# Patient Record
Sex: FEMALE | Race: AMERICAN INDIAN OR ALASKA NATIVE
[De-identification: names, ages, dates, MRNs, and addresses within clinical notes are randomized per-mention and may not be internally consistent; named-entity substitution may affect disease eponyms.]

---

## 2017-09-28 ENCOUNTER — HOSPITAL ENCOUNTER (INPATIENT)
Dept: HOSPITAL 31 - C.ER | Age: 32
LOS: 14 days | Discharge: HOME | DRG: 430 | End: 2017-10-12
Attending: PSYCHIATRY & NEUROLOGY | Admitting: PSYCHIATRY & NEUROLOGY
Payer: COMMERCIAL

## 2017-09-28 VITALS — BODY MASS INDEX: 33.2 KG/M2

## 2017-09-28 DIAGNOSIS — I10: ICD-10-CM

## 2017-09-28 DIAGNOSIS — F17.210: ICD-10-CM

## 2017-09-28 DIAGNOSIS — J45.909: ICD-10-CM

## 2017-09-28 DIAGNOSIS — F25.1: Primary | ICD-10-CM

## 2017-09-28 LAB
ALBUMIN/GLOB SERPL: 1.3 {RATIO} (ref 1–2.1)
ALP SERPL-CCNC: 55 U/L (ref 38–126)
ALT SERPL-CCNC: 45 U/L (ref 9–52)
AST SERPL-CCNC: 32 U/L (ref 14–36)
BASOPHILS # BLD AUTO: 0.1 K/UL (ref 0–0.2)
BASOPHILS NFR BLD: 0.6 % (ref 0–2)
BILIRUB SERPL-MCNC: 0.4 MG/DL (ref 0.2–1.3)
BILIRUB UR-MCNC: NEGATIVE MG/DL
BUN SERPL-MCNC: 10 MG/DL (ref 7–17)
CALCIUM SERPL-MCNC: 9 MG/DL (ref 8.6–10.4)
CHLORIDE SERPL-SCNC: 102 MMOL/L (ref 98–107)
CO2 SERPL-SCNC: 22 MMOL/L (ref 22–30)
EOSINOPHIL # BLD AUTO: 0.1 K/UL (ref 0–0.7)
EOSINOPHIL NFR BLD: 1.2 % (ref 0–4)
ERYTHROCYTE [DISTWIDTH] IN BLOOD BY AUTOMATED COUNT: 19.5 % (ref 11.5–14.5)
ETHANOL SERPL-MCNC: < 10 MG/DL (ref 0–10)
GLOBULIN SER-MCNC: 2.9 GM/DL (ref 2.2–3.9)
GLUCOSE SERPL-MCNC: 86 MG/DL (ref 65–105)
GLUCOSE UR STRIP-MCNC: NORMAL MG/DL
HCT VFR BLD CALC: 29.1 % (ref 34–47)
KETONES UR STRIP-MCNC: NEGATIVE MG/DL
LEUKOCYTE ESTERASE UR-ACNC: (no result) LEU/UL
LYMPHOCYTES # BLD AUTO: 1.7 K/UL (ref 1–4.3)
LYMPHOCYTES NFR BLD AUTO: 17.9 % (ref 20–40)
MCH RBC QN AUTO: 22.9 PG (ref 27–31)
MCHC RBC AUTO-ENTMCNC: 31.5 G/DL (ref 33–37)
MCV RBC AUTO: 72.5 FL (ref 81–99)
MONOCYTES # BLD: 0.6 K/UL (ref 0–0.8)
MONOCYTES NFR BLD: 6.2 % (ref 0–10)
NRBC BLD AUTO-RTO: 0.1 % (ref 0–2)
PH UR STRIP: 5 [PH] (ref 5–8)
PLATELET # BLD: 387 K/UL (ref 130–400)
PMV BLD AUTO: 8.4 FL (ref 7.2–11.7)
POTASSIUM SERPL-SCNC: 3.5 MMOL/L (ref 3.6–5.2)
PROT SERPL-MCNC: 6.8 G/DL (ref 6.3–8.3)
PROT UR STRIP-MCNC: NEGATIVE MG/DL
RBC # UR STRIP: NEGATIVE /UL
RBC #/AREA URNS HPF: 1 /HPF (ref 0–3)
SODIUM SERPL-SCNC: 138 MMOL/L (ref 132–148)
SP GR UR STRIP: 1.02 (ref 1–1.03)
UROBILINOGEN UR-MCNC: 2 MG/DL (ref 0.2–1)
WBC # BLD AUTO: 9.4 K/UL (ref 4.8–10.8)
WBC #/AREA URNS HPF: 14 /HPF (ref 0–5)

## 2017-09-28 NOTE — C.PDOC
History Of Present Illness


Patient presents to the ER with a complaint of feeling depressed with suicidal 

ideation. She states that she has no plan.  Patient also states she is homeless 

and notes she recently gave birth at the shelter about 6 weeks ago. Denies 

physical complaints at this time.


Time Seen by Provider: 17 22:51


Chief Complaint (Nursing): Psychiatric Evaluation


History Per: Patient


History/Exam Limitations: no limitations


Onset/Duration Of Symptoms: Days


Current Symptoms Are (Timing): Still Present


Suicide/Self Injury Attempted (Context): None


Modifying Factor(s): None


Severity: None


Pain Scale Rating Of: 0


Associated Symptoms: Depression, Suicidal Thoughts.  denies: Suicidal Plan


Involuntary Hold By: None


Recent travel outside of the [place]: No


Additional History Per: EMS





Past Medical History


Reviewed: Historical Data, Nursing Documentation, Vital Signs


Vital Signs: 


 Last Vital Signs











Temp  98.5 F   17 21:26


 


Pulse  100 H  17 21:26


 


Resp  20   17 21:26


 


BP  144/93 H  17 21:26


 


Pulse Ox  100   17 23:58














- Medical History


PMH: Anemia, Anxiety, Asthma, Bipolar Disorder, HTN (Unknown), Schizophrenia


   Comment Only: Depression (Unknown)


Surgical History: 





- [facility] Procedures








CERVICAL LES DESTRUC NEC (02)


INDIVIDUAL PSYCHOTHERAPY, COGNITIVE-BEHAVIORAL (17)


INJECT/INFUSE NEC (10/21/14)


MEDICATION MANAGEMENT (10/30/16)


PSYCHIA INTERV/EVAL NEC (14)


VACUUM EXT DEL W EPISIOT (02)








Family History: States: No Known Family Hx





- Social History


Hx Tobacco Use: Yes


Hx Alcohol Use: Yes


Hx Substance Use: Yes





- Immunization History


Hx Tetanus Toxoid Vaccination: No


Hx Influenza Vaccination: No


Hx Pneumococcal Vaccination: No





Review Of Systems


Constitutional: Negative for: Fever, Chills


Cardiovascular: Negative for: Chest Pain


Gastrointestinal: Negative for: Nausea, Vomiting, Diarrhea


Genitourinary: Negative for: Dysuria


Musculoskeletal: Negative for: Back Pain


Skin: Negative for: Rash


Neurological: Negative for: Weakness


Psych: Positive for: Depression, Suicidal ideation





Physical Exam





- Physical Exam


Appears: Non-toxic, No Acute Distress


Skin: Warm, Dry


Head: Normacephalic


Eye(s): bilateral: Normal Inspection


Oral Mucosa: Moist


Neck: Supple


Chest: Symmetrical, No Tenderness


Cardiovascular: Rhythm Regular


Respiratory: No Rales, No Rhonchi, No Wheezing


Gastrointestinal/Abdominal: Soft, No Tenderness


Extremity: No Tenderness


Extremity: Bilateral: Atraumatic


Neurological/Psych: Oriented x3


Gait: Steady





ED Course And Treatment





- Laboratory Results


Result Diagrams: 


 17 22:50





 17 22:50


O2 Sat by Pulse Oximetry: 100


Progress Note: Blood work and urinalysis ordered. Crisis notified.





Disposition


Discussed With Dr.: Zach Blank


Comment: accepted the pt on his service and took over the care at 11:49 PM


Counseled Patient/Family Regarding: Studies Performed, Diagnosis





- Disposition


Disposition: HOSPITALIZED


Disposition Time: 22:52


Condition: FAIR





- POA


Present On Arrival: None





- Clinical Impression


Clinical Impression: 


 Schizoaffective disorder








- Scribe Statement


The provider has reviewed the documentation as recorded by the Scribe





Kade Cline





All medical record entries made by the Scribe were at my direction and 

personally dictated by me. I have reviewed the chart and agree that the record 

accurately reflects my personal performance of the history, physical exam, 

medical decision making, and the department course for this patient. I have 

also personally directed, reviewed, and agree with the discharge instructions 

and disposition.





Decision To Admit





- Pt Status Changed To:


Hospital Disposition Of: Inpatient





- Admit Certification


Admit to Inpatient:: After my assessment, the patient will require 

hospitalization for at least two midnights.  This is because of the severity of 

symptoms shown, intensity of services needed, and/or the medical risk in this 

patient being treated as an outpatient.





- InPatient:


Physician Admission Certification: I certify that this patient requires 2 or 

more midnights of care for the following reason:: After my assessment, the 

patient will require hospitalization for at least two midnights.  This is 

because of the severity of symptoms shown, intensity of services needed, and/or 

the medical risk in this patient being treated as an outpatient.





- .


Bed Request Type: Psychiatry


Admitting Physician: Zach Blank


Patient Diagnosis: 


 Schizoaffective disorder

## 2017-09-29 NOTE — PCM.BM
<Ronel Tom - Last Filed: 09/29/17 11:02>





Family Contact


Family involvement: Famliy/SO not involved





- Goals for Treatment


Patient goals for treatment: "I don't know."





Discharge/Continuing Care





- Education Needs


Education Needs: Patient Medication, Patient Coping Skills, Patient Placement 

options, Patient Community resources





- Discharge


Discharge Criteria: Tolerates medication w/o severe side effects, Free of 

Suicidal thoughts, No longer exhibiting s/s of withdrawal, Reduction of target 

symptoms


Discharge to:: Shelter





- Treatment Team Participation


Discussed with Family/SO: No


Was Patient/Family/SO present at Treatment Team Meeting: Yes





<Carlos Frias - Last Filed: 09/29/17 11:05>





- Diagnosis


(1) Schizoaffective disorder, depressive type


Status: Acute   


Interventions: 


Medications


MI for abstinence


CBT


09/29/17 11:06











<Shine Cooper - Last Filed: 10/10/17 04:37>





Treatment Plan Problems





- Problems identified on initial assessmt


  ** Depression


Date Initiated: 09/29/17


Time Initiated: 00:24


Assessment reference: NA


Status: Active





  ** Suicidal Ideation


Date Initiated: 09/29/17


Time Initiated: 00:24


Assessment reference: NA


Status: Active


Comment: Suicidal Ideation w/o plan





  ** Auditory & Visual Hallucination


Date Initiated: 09/29/17


Time Initiated: 00:28


Assessment reference: NA


Status: Active





Treatment assets and liabiliti


Patient Assests: cooperative, self-reliant, ADL independent, physically healthy

, good support system


Patient Liabilities: financial problems, substance abuse





- Milieu Protocol


Maintain good personal hygiene: daily Encourage regular showers, daily Remind 

patient to perform daily oral care, daily Assist patient to perform ADL's


Maintain personal safety: every shift Educate patient to report safety concerns 

to staff, every shift Monitor environment for contraband/sharps


Medication safety: Monitor for expected outcome, potential side effects: every 

shift, Assess barriers to learning: every shift, Assess readiness for 

medication education: every shift

## 2017-09-30 NOTE — PCM.PYCHPN
Psychiatric Progress Note





- Psychiatric Progress Note


Patient seen today, length of contact: 15 minutes


Patient Chief Complaint: 





"I'm hearing voices"


Problems Identified/Issues Discussed: 





Patient was seen. Chart was reviewed important content noted. Nurse input 

received that she is still suspicious and internally preoccupied. Patient 

reported AH but unable to express further. She appeared internally preoccupied. 

Patient  stated that he had trouble in slept. He stated that he is eating well. 

Patient denies any depressive symptoms. Denies suicidal or homicidal ideations. 

Patient does not report visual hallucinations. 


Diagnostic Results: 





Schizoaffective disorder


Medication Change: No


Medical Record Reviewed: Yes





Mental Status Examination





- Cognitive Function


Orientation: Person, Place, Situation, Time


Memory: Intact


Attention: Poor


Concentration: Poor


Association: Loose


Fund of Knowledge: WNL


Decription of patient's judgement and insights: 





limited/limited





- Mood


Mood: Depressed





- Affect


Affect: Blunted





- Speech


Speech: Soft





- Formal Thought Process


Formal Thought Process: No Impairment


Psychotic Thoughts and Behaviors: 





AH, Paranoid delusions





- Suicidal Ideation


Suicidal Ideation: No





- Homicidal Ideation


Homicidal Ideation: No





Goal/Treatment Plan





- Goal/Treatment Plan


Need for Continued Stay: Discharge may exacerbated symptoms


Progress Toward Problem(s) and Goals/Treatment Plan: 





Continue current treatment


therapy in [facility]


Meds benefits, s/e discussed with the pt and pt verbalized understanding and 

agree with the treatment plan.


Encouraged to attend groups


pt needs to stabilized on meds


Estimated Date of D/C: 10/06/17

## 2017-10-01 NOTE — PCM.PYCHPN
Psychiatric Progress Note





- Psychiatric Progress Note


Patient seen today, length of contact: 15 minutes


Patient Chief Complaint: 





"I'm feeling better"


Problems Identified/Issues Discussed: 





Patient was seen. Chart was reviewed important content noted. Nurse input 

received that pt is compliant with meds.  Pt is still suspicious and internally 

preoccupied. Patient reported AH telling her to do good things. She appeared 

internally preoccupied. Patient  stated that he had trouble in slept. She 

stated that she is eating well. Patient reported depressed mood, but denies 

suicidal or homicidal ideation. Patient does not report visual hallucinations. 





Pt reported LD and MR history


Diagnostic Results: 





Schizoaffective disorder 


Medication Change: No


Medical Record Reviewed: Yes





Mental Status Examination





- Cognitive Function


Orientation: Person, Place, Situation, Time


Memory: Intact


Attention: Poor


Concentration: Poor


Association: Loose


Fund of Knowledge: Poor


Decription of patient's judgement and insights: 





limited/limited





- Mood


Mood: Depressed





- Affect


Affect: Blunted





- Speech


Speech: Soft





- Formal Thought Process


Formal Thought Process: No Impairment


Psychotic Thoughts and Behaviors: 





AH





- Suicidal Ideation


Suicidal Ideation: No





- Homicidal Ideation


Homicidal Ideation: No





Goal/Treatment Plan





- Goal/Treatment Plan


Need for Continued Stay: Discharge may exacerbated symptoms


Progress Toward Problem(s) and Goals/Treatment Plan: 





Continue current treatment


therapy in [facility]


Meds benefits, s/e discussed with the pt and pt verbalized understanding and 

agree with the treatment plan.


Encouraged to attend groups


pt needs to stabilized on meds


Estimated Date of D/C: 10/06/17

## 2017-10-02 NOTE — PCM.PYCHPN
Psychiatric Progress Note





- Psychiatric Progress Note


Patient seen today, length of contact: 15 minutes


Patient Chief Complaint: 





Not feeling better. I still hear voices.


Problems Identified/Issues Discussed: 





Patient seen. Chart reviewed. Case discussed with staff.


Issues related to illness and treatment were discussed with the patient.





Reported compliant with treatment with no adverse affects. Tolerating treatment 

very well.





Patient states supporting auditory hallucinations noncommand type. Appeared 

internally preoccupied.





Will switch Haldol to Prolixin 10 mg at bedtime.





At the time of evaluation, patient was awake alert oriented 3, had no delusions

, no visual hallucinations but had auditory hallucinations, couldn't elaborate 

further, no suicidal ideations or homicidal ideations.








Medical Problems: 





None reported


Diagnostic Results: 





Reviewed


DSM 5 Symptoms Update: 





Some improvement with treatment


Medication Change: Yes (Discontinued Haldol. Started Prolixin 10 mg at bedtime.)


Medical Record Reviewed: Yes





Mental Status Examination





- Cognitive Function


Orientation: Person, Place, Situation, Time


Memory: Intact


Attention: WNL


Concentration: WNL


Association: WNL


Fund of Knowledge: [facility]


Decription of patient's judgement and insights: 





Fair





- Mood


Mood: Depressed





- Affect


Affect: Blunted





- Speech


Speech: Appropriate





- Formal Thought Process


Formal Thought Process: Hallucinations





- Suicidal Ideation


Suicidal Ideation: No





- Homicidal Ideation


Homicidal Ideation: No





Goal/Treatment Plan





- Goal/Treatment Plan


Need for Continued Stay: Remain at risks for inpatient hospitalization, 

Discharge may exacerbated symptoms, Severe functional impairment


Progress Toward Problem(s) and Goals/Treatment Plan: 





Patient education


Supportive therapy


Discontinued Haldol.


Start Prolixin 10 mg at bedtime.


Continue rest of the treatment as before.


Estimated Date of D/C: 10/06/17





- Smoking Cessation


Smoking Cessation Initiated: No

## 2017-10-03 NOTE — PCM.PYCHPN
Psychiatric Progress Note





- Psychiatric Progress Note


Patient seen today, length of contact: 15 minutes


Patient Chief Complaint: 





I'm feeling better with the treatment


Problems Identified/Issues Discussed: 





Patient seen. Chart reviewed. Case discussed with staff.


Issues related to illness and treatment were discussed with the patient.





Reported compliant with treatment with no adverse affects. Tolerating treatment 

very well.





Patient reported feeling better with much less hallucinations.





At the time of evaluation, patient was awake alert oriented 3, had no delusions

, no visual hallucinations but had auditory hallucinations, couldn't elaborate 

further, no suicidal ideations or homicidal ideations.








Medical Problems: 








None reported


Diagnostic Results: 








Reviewed


DSM 5 Symptoms Update: 





Improving with treatment


Medication Change: No


Medical Record Reviewed: Yes





Mental Status Examination





- Cognitive Function


Orientation: Person, Place, Situation, Time


Memory: Intact


Attention: WNL


Concentration: WNL


Association: WNL


Fund of Knowledge: [facility]


Decription of patient's judgement and insights: 








Fair





- Mood


Mood: Depressed (Less than before)





- Affect


Affect: Blunted





- Speech


Speech: Appropriate





- Formal Thought Process


Formal Thought Process: Hallucinations





- Suicidal Ideation


Suicidal Ideation: No





- Homicidal Ideation


Homicidal Ideation: No





Goal/Treatment Plan





- Goal/Treatment Plan


Need for Continued Stay: Remain at risks for inpatient hospitalization, 

Discharge may exacerbated symptoms, Severe functional impairment


Progress Toward Problem(s) and Goals/Treatment Plan: 





Patient education


Supportive therapy


Continue treatment as before


Estimated Date of D/C: 10/06/17





- Smoking Cessation


Smoking Cessation Initiated: No

## 2017-10-04 NOTE — PCM.PYCHPN
Psychiatric Progress Note





- Psychiatric Progress Note


Patient seen today, length of contact: 15 minutes


Patient Chief Complaint: 








I'm feeling better with the treatment


Problems Identified/Issues Discussed: 


Patient seen. Chart reviewed. Case discussed with staff.


Issues related to illness and treatment were discussed with the patient.





Reported compliant with treatment with no adverse affects. Tolerating treatment 

very well.





Patient reported feeling better. No hallucinations.





At the time of evaluation, patient was awake alert oriented 3, had no delusions

, no visual hallucinations but had auditory hallucinations, couldn't elaborate 

further, no suicidal ideations or homicidal ideations.








Medical Problems: 





None reported


Diagnostic Results: 





Reviewed


DSM 5 Symptoms Update: 





Improvement with treatment


Medication Change: No


Medical Record Reviewed: Yes





Mental Status Examination





- Cognitive Function


Orientation: Person, Place, Situation, Time


Memory: Intact


Attention: WNL


Concentration: WNL


Association: WN


Fund of Knowledge: [facility]


Decription of patient's judgement and insights: 





Fair





- Mood


Mood: Depressed (Much less than before)





- Affect


Affect: Depressed





- Speech


Speech: Appropriate





- Formal Thought Process


Formal Thought Process: No Impairment





- Suicidal Ideation


Suicidal Ideation: No





- Homicidal Ideation


Homicidal Ideation: No





Goal/Treatment Plan





- Goal/Treatment Plan


Need for Continued Stay: Remain at risks for inpatient hospitalization, 

Discharge may exacerbated symptoms, Severe functional impairment


Progress Toward Problem(s) and Goals/Treatment Plan: 








Patient education


Supportive therapy


Continue treatment as before


Estimated Date of D/C: 10/06/17





- Smoking Cessation


Smoking Cessation Initiated: No

## 2017-10-05 NOTE — PCM.PYCHPN
Psychiatric Progress Note





- Psychiatric Progress Note


Patient seen today, length of contact: 15 minutes


Patient Chief Complaint: 








I'm feeling better with the treatment. He is less voices.


Problems Identified/Issues Discussed: 








Patient seen. Chart reviewed. Case discussed with staff.





Issues related to illness and treatment were discussed with the patient.





Reported compliant with treatment with no adverse affects. 





Tolerating treatment very well.





Patient reported feeling better. Less hallucination, better mood.





At the time of evaluation, patient was awake alert oriented 3, had no delusions

, no visual hallucinations but had auditory hallucinations, couldn't elaborate 

further, no suicidal ideations or homicidal ideations.








Medical Problems: 








None reported


Diagnostic Results: 








Reviewed


DSM 5 Symptoms Update: 





Improving with treatment


Medication Change: Yes (Dose of mirtazapine increased to 30 mg at bedtime)


Medical Record Reviewed: Yes





Mental Status Examination





- Cognitive Function


Orientation: Person, Place, Situation, Time


Memory: Intact


Attention: WNL


Concentration: WNL


Association: WNL


Fund of Knowledge: WNL


Decription of patient's judgement and insights: 








Fair





- Mood


Mood: Anxious





- Affect


Affect: Constricted





- Speech


Speech: Appropriate





- Formal Thought Process


Formal Thought Process: Hallucinations





- Suicidal Ideation


Suicidal Ideation: No





- Homicidal Ideation


Homicidal Ideation: No





Goal/Treatment Plan





- Goal/Treatment Plan


Need for Continued Stay: Remain at risks for inpatient hospitalization, 

Discharge may exacerbated symptoms, Severe functional impairment


Progress Toward Problem(s) and Goals/Treatment Plan: 








Patient education


Supportive therapy


Dose of mirtazapine increased to 30 mg


Continue rest of the treatment as before


Estimated Date of D/C: 10/09/17





- Smoking Cessation


Smoking Cessation Initiated: No

## 2017-10-06 NOTE — PCM.BM
<VaishaliRonel - Last Filed: 10/06/17 10:18>





Treatment Plan Problems





- Problems identified on initial assessmt


  ** Depression


Date Initiated: 09/29/17


Time Initiated: 00:24


Assessment reference: NA


Status: Active





  ** Suicidal Ideation


Date Initiated: 09/29/17


Time Initiated: 00:24


Assessment reference: NA


Status: Active


Comment: Suicidal Ideation w/o plan





  ** Substance Abuse


Date Initiated: 09/29/17


Time Initiated: 00:25


Assessment reference: NA


Status: Active





  ** Auditory & Visual Hallucination


Date Initiated: 09/29/17


Time Initiated: 00:28


Assessment reference: NA


Status: Active





Treatment assets and liabiliti


Patient Assests: cooperative, self-reliant, ADL independent, physically healthy

, good support system


Patient Liabilities: financial problems, substance abuse





- Milieu Protocol


Maintain good personal hygiene: daily Encourage regular showers, daily Remind 

patient to perform daily oral care, daily Assist patient to perform ADL's


Maintain personal safety: every shift Educate patient to report safety concerns 

to staff, every shift Monitor environment for contraband/sharps


Medication safety: Monitor for expected outcome, potential side effects: every 

shift, Assess barriers to learning: every shift, Assess readiness for 

medication education: every shift


Milieu Narrative: 








Patient education


Supportive therapy


Dose of mirtazapine increased to 30 mg


Continue rest of the treatment as before





Family Contact


Family involvement: Famliy/SO not involved





- Goals for Treatment


Patient goals for treatment: "I don't know."





Discharge/Continuing Care





- Education Needs


Education Needs: Patient Medication, Patient Coping Skills, Patient Placement 

options, Patient Community resources





- Discharge


Discharge Criteria: Tolerates medication w/o severe side effects, Free of 

Suicidal thoughts, No longer exhibiting s/s of withdrawal, Reduction of target 

symptoms


Discharge to:: Shelter





- Treatment Team Participation


Patient/Family/SO Statement: 








Patient education


Supportive therapy


Dose of mirtazapine increased to 30 mg


Continue rest of the treatment as before


Discussed with Family/SO: No


Was Patient/Family/SO present at Treatment Team Meeting: Yes





Treatment Plan Review


Patient participation: No


Family/SO/Caregiver participation: No





- Problem


  ** Depression


Date Initiated: 10/06/17


Time Initiated: 10:19


Progress toward outcomes: unchanged





  ** Suicidal Ideation


Date Initiated: 10/06/17


Time Initiated: 10:19


Progress toward outcomes: unchanged





  ** Substance Abuse


Date Initiated: 10/06/17


Time Initiated: 10:19


Progress toward outcomes: improved





  ** Auditory & Visual Hallucination


Date Initiated: 10/06/17


Time Initiated: 10:19


Progress toward outcomes: unchanged





<Teresa Hirsch - Last Filed: 10/06/17 15:04>





Treatment Plan Review





- Discharge / Continuing Care


Discharge to:: Home


Behavioral Health Services: Partial hospital


Health Needs: Medications/Rx, Educational





<Carlos Frias - Last Filed: 10/06/17 18:32>





- Diagnosis


(1) Schizoaffective disorder, depressive type


Status: Acute   


Interventions: 








Medications


MI for abstinence


CBT





10/06/17 18:32

## 2017-10-06 NOTE — PCM.PYCHPN
Psychiatric Progress Note





- Psychiatric Progress Note


Patient seen today, length of contact: 15 minutes


Patient Chief Complaint: 








I'm still hearing voices.


Problems Identified/Issues Discussed: 








Patient seen. Chart reviewed. Case discussed with staff.





Issues related to illness and treatment were discussed with the patient.





Reported compliant with treatment with no adverse affects. 





Tolerating treatment very well.





Patient reported that she still hears voices and also depressed. Patient is not 

comfortable with fluphenazine and wants to use Haldol. Patient repeatedly 

insisted that Haldol helped her better. Education provided to the patient about 

medication. Still patient wants Haldol. Will start haloperidol 10 mg twice a 

day and will discontinue fluphenazine. We'll also increase the dose of 

mirtazapine to 45 mg from 30 mg at bedtime. Patient agreed with the changes.





At the time of evaluation, patient was awake alert oriented 3, had no delusions

, no visual hallucinations but had auditory hallucinations, couldn't elaborate 

further, no suicidal ideations or homicidal ideations.








Medical Problems: 





None reported


Diagnostic Results: 





Reviewed


DSM 5 Symptoms Update: 





Some improvement with treatment


Medication Change: Yes (Started haloperidol, increase the dose of mirtazapine, 

discontinued fluphen)


Medical Record Reviewed: Yes





Mental Status Examination





- Cognitive Function


Orientation: Person, Place, Situation, Time


Memory: Intact


Attention: WNL


Concentration: WNL


Association: WNL


Fund of Knowledge: [facility]


Decription of patient's judgement and insights: 








Jorge





- Mood


Mood: Anxious





- Affect


Affect: Constricted





- Speech


Speech: Appropriate





- Formal Thought Process


Formal Thought Process: Hallucinations





- Suicidal Ideation


Suicidal Ideation: No





- Homicidal Ideation


Homicidal Ideation: No





Goal/Treatment Plan





- Goal/Treatment Plan


Need for Continued Stay: Remain at risks for inpatient hospitalization, 

Discharge may exacerbated symptoms, Severe functional impairment


Progress Toward Problem(s) and Goals/Treatment Plan: 








Patient education


Supportive therapy


Increased dose of mirtazapine to 45 mg.


Discontinued fluphenazine.


Start haloperidol 10 mg twice a day.


Estimated Date of D/C: 10/09/17





- Smoking Cessation


Smoking Cessation Initiated: No

## 2017-10-07 NOTE — PCM.PYCHPN
Psychiatric Progress Note





- Psychiatric Progress Note


Patient seen today, length of contact: 15 minutes


Patient Chief Complaint: 





"I hear voices sometimes"


Problems Identified/Issues Discussed: 





The pt is seen, chart reviewed, case discussed with staff.


The pt is compliant with medications and reports no side-effects.


Symptoms are improving but needs more time to stabilize. 


After care discussed, support and psychoeducation given.


Pt needs depot medication, will do tomorrow


She also needs [facility] or a good [facility] IDT





Medication Change: No


Medical Record Reviewed: Yes





Mental Status Examination





- Cognitive Function


Orientation: Person, Place, Situation, Time


Memory: Intact


Attention: WNL


Concentration: Poor


Association: WNL


Fund of Knowledge: WNL





- Mood


Mood: Anxious





- Affect


Affect: Blunted





- Speech


Speech: Appropriate





- Formal Thought Process


Formal Thought Process: Hallucinations





- Suicidal Ideation


Suicidal Ideation: No





- Homicidal Ideation


Homicidal Ideation: No





Goal/Treatment Plan





- Goal/Treatment Plan


Need for Continued Stay: Remain at risks for inpatient hospitalization, 

Discharge may exacerbated symptoms, Severe functional impairment


Progress Toward Problem(s) and Goals/Treatment Plan: 





Continue medications


Support and psychoeducation daily


Attend groups and activities daily


After care planning by BRENDAN





Estimated Date of D/C: 10/11/17

## 2017-10-08 NOTE — PCM.PYCHPN
Psychiatric Progress Note





- Psychiatric Progress Note


Patient seen today, length of contact: 15 minutes


Patient Chief Complaint: 





"I am not well"


Problems Identified/Issues Discussed: 


The pt is seen, chart reviewed, case discussed with staff.


Support given, CBT and MI used briefly


No SEs from meds - agreed with kari Quinonez - will obtain tomorrow


No new symptoms reported, improving slowly and needs more time


No SEs from medications, risks discussed.


After care discussed, she needs [facility] or a good [facility] IDT





Medication Change: No


Medical Record Reviewed: Yes





Mental Status Examination





- Cognitive Function


Orientation: Person, Place, Situation, Time


Memory: Intact


Attention: WNL


Concentration: Poor


Association: WNL


Fund of Knowledge: WNL





- Mood


Mood: Anxious





- Affect


Affect: Blunted





- Speech


Speech: Appropriate





- Formal Thought Process


Formal Thought Process: Hallucinations





- Suicidal Ideation


Suicidal Ideation: No





- Homicidal Ideation


Homicidal Ideation: No





Goal/Treatment Plan





- Goal/Treatment Plan


Need for Continued Stay: Remain at risks for inpatient hospitalization, 

Discharge may exacerbated symptoms, Severe functional impairment


Progress Toward Problem(s) and Goals/Treatment Plan: 





Continue medications


Start Abilify and then given Abilify Maintena


Support and psychoeducation daily


Attend groups and activities daily


After care planning by BRENDAN PARKER





Estimated Date of D/C: 10/11/17

## 2017-10-09 VITALS — OXYGEN SATURATION: 99 %

## 2017-10-09 NOTE — PCM.PYCHPN
Psychiatric Progress Note





- Psychiatric Progress Note


Patient seen today, length of contact: 15 minutes


Patient Chief Complaint: 








I'm feeling little better. Mood is better but I still hear voices.


Problems Identified/Issues Discussed: 








Patient seen. Chart reviewed. Case discussed with staff.





Issues related to illness and treatment were discussed with the patient.





Reported compliant with treatment with no adverse affects. 





Tolerating treatment very well.





Patient reported that she still hears voices. We will increase the dose of 

Haldol to 10 mg twice a day as patient reported that Haldol helped her the 

best. Also offered Haldol Decanoate. Patient understood and agreed. We will 

provide first injection of Haldol Decanoate 50 mg today.





At the time of evaluation, patient was awake alert oriented 3, had no delusions

, no visual hallucinations but had auditory hallucinations, couldn't elaborate 

further, no suicidal ideations or homicidal ideations.








Medical Problems: 








None reported


Diagnostic Results: 








Reviewed


DSM 5 Symptoms Update: 





Improving with treatment


Medication Change: Yes (Dose of Haldol increased to 10 mg twice a day, also 

injection Haldol Decano)


Medical Record Reviewed: Yes





Mental Status Examination





- Cognitive Function


Orientation: Person, Place, Situation, Time


Memory: Intact


Attention: WNL


Concentration: WNL


Association: WNL


Fund of Knowledge: [facility]


Decription of patient's judgement and insights: 





Fair





- Mood


Mood: Other





- Affect


Affect: Blunted





- Speech


Speech: Appropriate





- Formal Thought Process


Formal Thought Process: Hallucinations





- Suicidal Ideation


Suicidal Ideation: No





- Homicidal Ideation


Homicidal Ideation: No





Goal/Treatment Plan





- Goal/Treatment Plan


Need for Continued Stay: Remain at risks for inpatient hospitalization, 

Discharge may exacerbated symptoms, Severe functional impairment


Progress Toward Problem(s) and Goals/Treatment Plan: 








Patient education


Supportive therapy


Will increase the dose of Haldol to 10 mg twice a day.


We will also start Haldol Decanoate 50 mg, intramuscular, every 4 weeks.


Continue rest of the treatment as before.


Estimated Date of D/C: 10/11/17





- Smoking Cessation


Smoking Cessation Initiated: No

## 2017-10-10 NOTE — PCM.PYCHPN
Psychiatric Progress Note





- Psychiatric Progress Note


Patient seen today, length of contact: 15 minutes


Patient Chief Complaint: 








I'm feeling much better.


Problems Identified/Issues Discussed: 








Patient seen. Chart reviewed. Case discussed with staff.





Issues related to illness and treatment were discussed with the patient.





Reported compliant with treatment with no adverse affects. 





Tolerating treatment very well.





Reported feeling much better. Patient got injection Haldol Decanoate 50 mg 

yesterday.





Patient reported that hallucinations in intensity of hallucinations is 75% less.





At the time of evaluation, patient was awake alert oriented 3, had no delusions

, no visual hallucinations but had auditory hallucinations, couldn't elaborate 

further, no suicidal ideations or homicidal ideations.








Medical Problems: 





None reported


Diagnostic Results: 





Reviewed


DSM 5 Symptoms Update: 





Improving with treatment


Medication Change: No


Medical Record Reviewed: Yes





Mental Status Examination





- Cognitive Function


Orientation: Person, Place, Situation, Time


Memory: Intact


Attention: WNL


Concentration: WNL


Association: WN


Fund of Knowledge: [facility]


Decription of patient's judgement and insights: 








Fair





- Mood


Mood: Other (Better)





- Affect


Affect: Blunted





- Speech


Speech: Appropriate





- Formal Thought Process


Formal Thought Process: Hallucinations (Much less than before)





- Suicidal Ideation


Suicidal Ideation: No





- Homicidal Ideation


Homicidal Ideation: No





Goal/Treatment Plan





- Goal/Treatment Plan


Need for Continued Stay: Remain at risks for inpatient hospitalization, 

Discharge may exacerbated symptoms, Severe functional impairment


Progress Toward Problem(s) and Goals/Treatment Plan: 








Patient education


Supportive therapy


Continue treatment as before.


Estimated Date of D/C: 10/11/17





- Smoking Cessation


Smoking Cessation Initiated: No

## 2017-10-11 VITALS — RESPIRATION RATE: 18 BRPM

## 2017-10-11 NOTE — PCM.PYCHPN
Psychiatric Progress Note





- Psychiatric Progress Note


Patient seen today, length of contact: 15 minutes


Patient Chief Complaint: 








I'm feeling much better.


Problems Identified/Issues Discussed: 








Patient seen. Chart reviewed. Case discussed with staff.





Issues related to illness and treatment were discussed with the patient.





Reported compliant with treatment with no adverse affects. 





Tolerating treatment very well.





Reported feeling much better. Patient got injection Haldol Decanoate 50 mg. we'

ll give another dose of injection Haldol Decanoate 50 mg today.





Patient reported decrease in intensity of hallucinations by 75%.





At the time of evaluation, patient was awake alert oriented 3, had no delusions

, no visual hallucinations but had auditory hallucinations, couldn't elaborate 

further, no suicidal ideations or homicidal ideations.








Medical Problems: 








None reported


Diagnostic Results: 








Reviewed


DSM 5 Symptoms Update: 





Improvement with treatment


Medication Change: Yes (Haldol Decanoate 50 mg)


Medical Record Reviewed: Yes





Mental Status Examination





- Cognitive Function


Orientation: Person, Place, Situation, Time


Memory: Intact


Attention: WNL


Concentration: WNL


Association: WN


Fund of Knowledge: [facility]


Decription of patient's judgement and insights: 





Fair





- Mood


Mood: Other (Better)





- Affect


Affect: Blunted





- Speech


Speech: Appropriate





- Formal Thought Process


Formal Thought Process: Hallucinations (Much less than before)





- Suicidal Ideation


Suicidal Ideation: No





- Homicidal Ideation


Homicidal Ideation: No





Goal/Treatment Plan





- Goal/Treatment Plan


Need for Continued Stay: Remain at risks for inpatient hospitalization, 

Discharge may exacerbated symptoms, Severe functional impairment


Progress Toward Problem(s) and Goals/Treatment Plan: 





Patient education


Supportive therapy


Injection Haldol Decanoate 50 mg


Continue rest of the treatment as before.


Estimated Date of D/C: 10/13/17





- Smoking Cessation


Smoking Cessation Initiated: No

## 2017-10-12 VITALS — DIASTOLIC BLOOD PRESSURE: 71 MMHG | TEMPERATURE: 97.6 F | HEART RATE: 62 BPM | SYSTOLIC BLOOD PRESSURE: 108 MMHG

## 2017-10-12 NOTE — PCM.PYCHDC
Mental Status Examination





- Mental Status Examination


Orientation: Person, Place, Situation, Time


Memory: Intact


Mood: Neutral


Affect: Other


Attention: WNL


Concentration: WNL


Association: WNL


Fund of Knowledge: WNL


Formal Thought Process: No Impairment


Description of patient's judgement and insight: 








Fair


Psychotic Thoughts and Behaviors: 





None


Suicidal Ideation: No


Current Homicidal Ideation?: No





Discharge Summary





- Discharge Note


Reason for Hospitalization: 





Schizoaffective disorder


Laboratory Data: 





Reviewed


Consultations:: List each consultation separately and include:  1. Reason for 

request.  2. Findings.  3. Follow-up


Summary of Hospital Course include:: 1. Description of specific treatment plan 

utilized for patients during their course of treatmen.  2. Summarize the time-

course for resolution of acute symptoms and/or regressed behaviors.  3. 

Describe issues identified and worked on during hospitalization.  4. Describe 

medication utilized.  5. Describe medical problems identified and treated.  6. 

Reassessment of suicide risk


Summary of Hospital Course: 





Patient is a 32 years old, single, unemployed, -American female, with 

history of schizoaffective disorder depressive type, follow-up at [facility] was admitted due to worsening symptoms of depression and 

suicidal ideations without plan.





Patient reported feeling depressed, with decreased sleep, feeling tired, no 

change in appetite but lost about 200 pounds over 2 months. Denied any current 

suicidal ideations or homicidal ideations. Denied any suicidal attempts in the 

past. Patient reported had suicidal ideations before admitting to the hospital.


Also reported hearing voices, couldn't elaborate about voices. Sees things. 

Patient sees shadows. Also believed someone is after her.


Denied any manic symptoms. History of more than 5 previous inpatient 

psychiatric admissions.





Patient reports that she drinks 1 beer daily. Last drink reported yesterday. 

Denied using any other drugs including cocaine, cannabis or heroin. Smokes 3 

cigarettes daily. Refuses to take nicotine patch.





Patient was born in [place], has ninth grade of education. Not working. 

Never . Has one, one month for child. Patient's sister has custody of 

patient's son. Lives alone. Height is 5 feet 5 inches and weight is 180 pounds.





During her stay in the hospital patient was treated with Haldol, which was 

changed to fluphenazine. Fluphenazine was discontinued and again started Haldol 

as Haldol helped the patient the most. Patient also received Haldol decanoate. 

Patient also attending groups and other activities on the unit. With the above 

treatment patient started feeling better.





Today patient was stable and ready for discharge. At the time of evaluation and 

discharge, patient was awake alert oriented 3, had no delusions, no auditory 

or visual hallucinations, no suicidal ideations or homicidal ideations. Patient 

was discharged in a stable condition.





- Diagnosis


(1) Schizoaffective disorder, depressive type


Status: Acute   





- Final Diagnosis (DSM 5)


Condition upon Discharge: FAIR


Disposition: HOME/ ROUTINE


Follow-up Treatment Plan: 





[facility], [facility]


Prescriptions/Medication Reconciliation: 


Benztropine [Cogentin] 1 mg PO BID #60 tab


Haloperidol [Haldol] 10 mg PO BID #60 tab


Mirtazapine [Remeron] 45 mg PO HS #30 tab


traZODone [Desyrel] 100 mg PO HS PRN #30 tab


 PRN Reason: Sleep





- Smoking Cessation


Smoking Cessation Medication prescribed: No





- Antipsychotic Medications


Pt discharged on 2 or more routine antipsychotic medications: No

## 2017-11-05 ENCOUNTER — HOSPITAL ENCOUNTER (INPATIENT)
Dept: HOSPITAL 31 - C.ER | Age: 32
LOS: 11 days | Discharge: HOME | DRG: 430 | End: 2017-11-16
Attending: PSYCHIATRY & NEUROLOGY | Admitting: PSYCHIATRY & NEUROLOGY
Payer: MEDICAID

## 2017-11-05 VITALS — BODY MASS INDEX: 33.2 KG/M2

## 2017-11-05 DIAGNOSIS — I10: ICD-10-CM

## 2017-11-05 DIAGNOSIS — R45.851: ICD-10-CM

## 2017-11-05 DIAGNOSIS — J45.909: ICD-10-CM

## 2017-11-05 DIAGNOSIS — G47.00: ICD-10-CM
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
## 2017-11-05 DIAGNOSIS — F17.210: ICD-10-CM

## 2017-11-05 DIAGNOSIS — F10.20: ICD-10-CM

## 2017-11-05 DIAGNOSIS — F41.8: ICD-10-CM

## 2017-11-05 DIAGNOSIS — F31.9: ICD-10-CM

## 2017-11-05 DIAGNOSIS — F25.1: Primary | ICD-10-CM

## 2017-11-05 LAB
ALBUMIN/GLOB SERPL: 1.1 {RATIO} (ref 1–2.1)
ALP SERPL-CCNC: 69 U/L (ref 38–126)
ALT SERPL-CCNC: 34 U/L (ref 9–52)
AST SERPL-CCNC: 24 U/L (ref 14–36)
BASOPHILS # BLD AUTO: 0.1 K/UL (ref 0–0.2)
BASOPHILS NFR BLD: 1.1 % (ref 0–2)
BILIRUB SERPL-MCNC: 0.2 MG/DL (ref 0.2–1.3)
BILIRUB UR-MCNC: NEGATIVE MG/DL
BUN SERPL-MCNC: 10 MG/DL (ref 7–17)
CALCIUM SERPL-MCNC: 8.9 MG/DL (ref 8.6–10.4)
CHLORIDE SERPL-SCNC: 103 MMOL/L (ref 98–107)
CO2 SERPL-SCNC: 22 MMOL/L (ref 22–30)
EOSINOPHIL # BLD AUTO: 0 K/UL (ref 0–0.7)
EOSINOPHIL NFR BLD: 0.4 % (ref 0–4)
ERYTHROCYTE [DISTWIDTH] IN BLOOD BY AUTOMATED COUNT: 20.8 % (ref 11.5–14.5)
ETHANOL SERPL-MCNC: < 10 MG/DL (ref 0–10)
GLOBULIN SER-MCNC: 3.9 GM/DL (ref 2.2–3.9)
GLUCOSE SERPL-MCNC: 81 MG/DL (ref 65–105)
GLUCOSE UR STRIP-MCNC: NORMAL MG/DL
HCT VFR BLD CALC: 31.4 % (ref 34–47)
KETONES UR STRIP-MCNC: NEGATIVE MG/DL
LEUKOCYTE ESTERASE UR-ACNC: (no result) LEU/UL
LYMPHOCYTES # BLD AUTO: 2.7 K/UL (ref 1–4.3)
LYMPHOCYTES NFR BLD AUTO: 22.3 % (ref 20–40)
MCH RBC QN AUTO: 21.8 PG (ref 27–31)
MCHC RBC AUTO-ENTMCNC: 31.3 G/DL (ref 33–37)
MCV RBC AUTO: 69.6 FL (ref 81–99)
MONOCYTES # BLD: 0.5 K/UL (ref 0–0.8)
MONOCYTES NFR BLD: 4.4 % (ref 0–10)
NRBC BLD AUTO-RTO: 0 % (ref 0–2)
PH UR STRIP: 6 [PH] (ref 5–8)
PLATELET # BLD: 394 K/UL (ref 130–400)
PMV BLD AUTO: 7.6 FL (ref 7.2–11.7)
POTASSIUM SERPL-SCNC: 3.6 MMOL/L (ref 3.6–5.2)
PROT SERPL-MCNC: 8.1 G/DL (ref 6.3–8.3)
PROT UR STRIP-MCNC: NEGATIVE MG/DL
RBC # UR STRIP: NEGATIVE /UL
RBC #/AREA URNS HPF: < 1 /HPF (ref 0–3)
SODIUM SERPL-SCNC: 136 MMOL/L (ref 132–148)
SP GR UR STRIP: 1.02 (ref 1–1.03)
UROBILINOGEN UR-MCNC: NORMAL MG/DL (ref 0.2–1)
WBC # BLD AUTO: 11.9 K/UL (ref 4.8–10.8)
WBC #/AREA URNS HPF: 1 /HPF (ref 0–5)

## 2017-11-05 PROCEDURE — GZHZZZZ GROUP PSYCHOTHERAPY: ICD-10-PCS | Performed by: PSYCHIATRY & NEUROLOGY

## 2017-11-05 PROCEDURE — GZ56ZZZ INDIVIDUAL PSYCHOTHERAPY, SUPPORTIVE: ICD-10-PCS | Performed by: PSYCHIATRY & NEUROLOGY

## 2017-11-05 NOTE — C.PDOC
History Of Present Illness


32 year old female with history of schizophrenia, hypertension, and diabetes 

presents to the ED for evaluation of suicidal ideation with plan since 

yesterday. She states that her plan is to drink herself to death and admits to 

alcohol use today. Patient reports multiple recent psychiatric admissions. In 

August, the patient delivered a baby while living at shelter and she did not 

have any prenatal care. She was admitted to [facility] and then here for 

depression with suicidal ideation. She was discharged in mid October and 

supposed to be on Haldol but she has not been compliant. She is not on 

medications for any of her other medical problems either. 


Time Seen by Provider: 17 21:04


Chief Complaint (Nursing): Psychiatric Evaluation


History Per: Patient


History/Exam Limitations: intoxication


Onset/Duration Of Symptoms: Days


Current Symptoms Are (Timing): Still Present


Modifying Factor(s): Alcohol


Associated Symptoms: Depression, Suicidal Thoughts, Suicidal Plan





Past Medical History


Reviewed: Historical Data, Nursing Documentation, Vital Signs


Vital Signs: 


 Last Vital Signs











Temp  98.1 F   17 22:05


 


Pulse  92 H  17 22:05


 


Resp  16   17 22:05


 


BP  135/86   17 22:05


 


Pulse Ox  100   17 22:05














- Medical History


PMH: Anemia, Anxiety, Asthma, Bipolar Disorder, Depression, HTN (NO MEDS PER PT)

, Schizophrenia


   Denies: Diabetes, Hepatitis, HIV, Chronic Kidney Disease, Seizures, Sexually 

Transmitted Disease


Surgical History: 





- [facility] Procedures








CERVICAL LES DESTRUC NEC (02)


INDIVIDUAL PSYCHOTHERAPY, COGNITIVE-BEHAVIORAL (17)


INJECT/INFUSE NEC (10/21/14)


MEDICATION MANAGEMENT (10/30/16)


PSYCHIA INTERV/EVAL NEC (14)


VACUUM EXT DEL W EPISIOT (02)








Family History: States: Unknown Family Hx





- Social History


Hx Tobacco Use: Yes


Hx Alcohol Use: Yes


Hx Substance Use: No





- Immunization History


Hx Tetanus Toxoid Vaccination: No


Hx Influenza Vaccination: No


Hx Pneumococcal Vaccination: No





Review Of Systems


Psych: Positive for: Depression, Suicidal ideation, Other (Schizophrenia)





Physical Exam





- Physical Exam


Appears: Non-toxic, No Acute Distress


Skin: Normal Color, Warm, Dry


Head: Atraumatic, Normacephalic


Eye(s): bilateral: Normal Inspection


Neck: Normal ROM, Supple


Cardiovascular: Rhythm Regular (Rate Regular)


Respiratory: Normal Breath Sounds (Clear to ascultation bilaterally )


Gastrointestinal/Abdominal: Soft, No Tenderness


Extremity: No Pedal Edema, No Deformity


Neurological/Psych: Oriented x3





ED Course And Treatment





- Laboratory Results


Result Diagrams: 


 17 21:21





 17 21:21


Lab Interpretation: No Acute Changes


O2 Sat by Pulse Oximetry: 100


Pulse Ox Interpretation: Normal


Reevaluation Time: 22:40


Reassessment Condition: Improved (Patient remains comfortable  in ED.)





- Physician Consult Information


Outcome Of Conversation: Case reviewed by Dr Blank. Patient to be admitted for 

psychiatric observation.





Disposition





- Disposition


Disposition: HOSPITALIZED


Disposition Time: 22:41


Condition: STABLE





- POA


Present On Arrival: None





- Clinical Impression


Clinical Impression: 


 Suicidal ideation, Schizophrenia, Depression








- Scribe Statement


The provider has reviewed the documentation as recorded by the Scribe


Michael Pierce


Provider Attestation: 


All medical record entries made by the Scribe were at my direction and 

personally dictated by me. I have reviewed the chart and agree that the record 

accurately reflects my personal performance of the history, physical exam, 

medical decision making, and the department course for this patient. I have 

also personally directed, reviewed, and agree with the discharge instructions 

and disposition.

## 2017-11-06 NOTE — PCM.BM
<LuisafuaLena - Last Filed: 11/06/17 00:05>





Treatment Plan Problems





- Problems identified on initial assessmt


  ** Depression


Date Initiated: 11/06/17


Time Initiated: 00:05


Assessment reference: NA


Status: Active


Priority: 1





  ** Suicidal Ideation


Date Initiated: 11/06/17 (with plan: OD on alcohol)


Time Initiated: 00:05


Assessment reference: NA


Status: Active


Priority: 2





Treatment assets and liabiliti


Patient Assests: cooperative, self-reliant, ADL independent, physically healthy

, good support system, negotiates basic needs


Patient Liabilities: financial problems, relationship conflicts, substance abuse

, other (Cognitive function; impaired)





- Milieu Protocol


Maintain good personal hygiene: daily Encourage regular showers, daily Remind 

patient to perform daily oral care


Maintain personal safety: daily Educate patient to report safety concerns to 

staff (prn), other Monitor environment for contraband/sharps (q15min)


Medication safety: Monitor for expected outcome, potential side effects: every 

shift, Assess barriers to learning: every shift, Assess readiness for 

medication education: every shift





<Franchesca Ambrocio - Last Filed: 11/06/17 11:17>





- Diagnosis


(1) Schizophrenia


Status: Acute   


Interventions: 





11/06/17 11:17


* Assess 7x/week regarding severity of withdrawal


* Educate regarding risks, benefits, side effects and alternatives of 

medications


* Use Motivational Interviewing for abstinence


* Use CBT for relapse prevention


* Medication management for withdrawal symptoms


* Encourage medication assisted treatment


* 








(2) Alcohol use disorder, severe, dependence


Status: Acute   


Interventions: 





11/06/17 11:17


* Assess 7x/week regarding severity of withdrawal


* Educate regarding risks, benefits, side effects and alternatives of 

medications


* Use Motivational Interviewing for abstinence


* Use CBT for relapse prevention


* Medication management for withdrawal symptoms


* Encourage medication assisted treatment


* 








<Ronel Tom - Last Filed: 11/06/17 11:19>





Family Contact


Family involvement: Famliy/SO not involved





- Goals for Treatment


Patient goals for treatment: "I don't know."





Discharge/Continuing Care





- Education Needs


Education Needs: Patient Medication, Patient Coping Skills, Patient Placement 

options, Patient Community resources





- Discharge


Discharge Criteria: Tolerates medication w/o severe side effects, No longer 

exhibiting s/s of withdrawal, Reduction of target symptoms


Discharge to:: Shelter





- Treatment Team Participation


Discussed with Family/SO: No


Was Patient/Family/SO present at Treatment Team Meeting: Yes

## 2017-11-06 NOTE — PCM.PSYCH
Initial Psychiatric Evaluation





- Initial Psychiatric Evaluation


Type of Admission: Voluntary


Legal Status: Capacity


Chief Complaint (in patient's own words): 





"I feel suicidal for some reason"





History of Present Illness and Precipitating Events: 





Patient is a 32 years old, single, unemployed, -American female, with 

history of schizoaffective disorder depressive type, follow-up at [facility] was admitted due to worsening symptoms of depression and 

suicidal ideations without plan. 





She currently presents with suicidal ideations and auditory hallucinations that 

tell her to harm and kill herself.


 


Patient was last seen on 9/28/2017. During that admission patient reported 

suicidal ideation and depression with no plan. Later, Patient was reported to 

have changed her story and report plan to overdose using heroin. Patent's BAL 

was 31. Patient denied auditory/ visual hallucinations. Patient has history of 

alcohol use, heroin use and marijuana use. It was recommended that patient 

attend [facility] outpatient, however, Patient does not participate in the program. 

Patient has history of being prescirbed haldol.





After latest discharge patient stated she began drinking again. She initially 

reports that she drinks 4 shots a day but later said she drinks 2 pints a day. 

Denied using any other drugs including cocaine, cannabis or heroin. 





Patient was born in [place], has ninth grade of education. Not working. 

Never . Has one, one month for child. Patient's sister has custody of 

patient's son. Lives alone. Height is 5 feet 5 inches and weight is 180 pounds.





Writer contacted Patient's grandmother who was very annoyed by phone call. 

Patient's grandmother stated "she's playing you all. Endeelia is drinking. She 

left her mother's where she had a place to stay. She won't do the right thing 

and right now there is no hope for her. She's using the system because she does'

t want to do the right thing." Patient stated she was kicked out of her mother'

s house by her mother because her mother was fighting with her boyfriend. 





Patient's grandmother refused to provide contact information for additional 

information from Patient's mother or sister, Tavia. Patient's grandmother noted 

that Patient knows the contact number and crisis worker should deal directly 

with [employer].





Patient is a poor historian as noted by disorganized thoughts.





Current Medications: 





Active Medications











Generic Name Dose Route Start Last Admin





  Trade Name Freq  PRN Reason Stop Dose Admin


 


Benztropine Mesylate  1 mg  11/06/17 10:00  11/06/17 09:38





  Cogentin  PO   Not Given





  BID JESSI   


 


Haloperidol  5 mg  11/06/17 10:00  11/06/17 09:38





  Haldol  PO   Not Given





  BID JESSI   


 


Hydroxyzine HCl  50 mg  11/05/17 23:29  





  Atarax  PO   





  Q6H PRN   





  Anxiety   


 


Ibuprofen  600 mg  11/05/17 23:29  





  Motrin Tab  PO   





  Q6H PRN   





  Pain, moderate (4-7)   


 


Mirtazapine  30 mg  11/05/17 23:30  11/06/17 00:00





  Remeron  PO   30 mg





  HS JESSI   Administration


 


Trazodone HCl  100 mg  11/05/17 23:29  11/06/17 00:00





  Desyrel  PO   100 mg





  HS PRN   Administration





  Insomnia   














Past Psychiatric History





- Past Psychiatric History


Pertinent Medical Hx (Current Medical&Sleep Prob, Allergies): 





 Allergies











Allergy/AdvReac Type Severity Reaction Status Date / Time


 


No Known Allergies Allergy   Verified 11/05/17 21:05








 





No Known Home Med  11/05/17 











Review of Systems





- Psychiatric


Psychiatric: Abnormal Sleep Pattern, Auditory Hallucinations, Depression, 

Homicidal Ideation, Suicidal Ideation.  absent: Visual Hallucinations





Mental Status Examination





- Personal Presentation


Personal Presentation: Looks stated age





- Affect


Affect: Blunted





- Motor Activity


Motor Activity: Calm





- Reliability in Providing Information


Reliability in Providing Information: Fair





- Speech


Speech: Disorganized





- Formal Thought Process


Formal Thought Process: Hallucinations, Delusions, Paranoia





- Hallucinations/Delusions


Hallucinations: Auditory





- Cognitive Functions


Orientation: Person, Place


Sensorium: Lethargic


Estimate of Intelligence: Average


Judgement: Imparied, as evidence by: Lack of insight into illness


Memory: Recent intact, as evidence by: Ability to recall events of the day, 

Remote intact, as evidenced by: Abilit to recall sig. life events





- Risk


Risk: Suicidal, Diminished functioning





- Limitations


Limitations: Living alone





DSM 5 DX





- DSM 5


DSM 5 Diagnosis: 





Schizoeffective disorder depressive type





- Recommended/Plan of Treatment


Treatment Recommendations and Plan of Treatment: 





Start:


Haldol 5mg PO BID


Cogentin 1mg PO BID


atarax 50 mg. PRN Q6


Mertazipine 30 mg PO HS JESSI


Trazodone 100 mg PO HS JESSI


Attend groups and activities


Individual therapy


Psychoeducation and support


Encourage compliance with meds and after care


Refer to outpatient program 


Teach healthy lifestyle methods, i.e. diet, exercise, meditation


Smoking cessation





33 minutes


Projected ELOS: 7-8 days


Prognosis: good with treatment

## 2017-11-07 NOTE — PCM.PYCHPN
Psychiatric Progress Note





- Psychiatric Progress Note


Patient seen today, length of contact: 16 min


Patient Chief Complaint: 





"I feel suicidal for some reason"





Problems Identified/Issues Discussed: 





Patient seen and evaluated, chart reviewed and discussed with the nurse. 





Patient remained disorganized and internally preoccupied. Patient remained 

isolated, confined and withdrawn. She still reports of hearing voices and still 

appears paranoid and delusional. She reports depressed mood and feelings of 

hopelessness and helplessness. She is taking meds and denies any side effects. 





She needs more time for stabilization.





Supportive therapy and psychoeducation were given.


Medication Change: No


Medical Record Reviewed: Yes





Mental Status Examination





- Cognitive Function


Orientation: Person, Place


Memory: Intact


Attention: WNL


Concentration: Poor


Association: Loose


Fund of Knowledge: Poor





- Mood


Mood: Depressed, Anxious





- Affect


Affect: Constricted, Depressed





- Speech


Speech: Soft





- Formal Thought Process


Formal Thought Process: Hallucinations, Delusions, Paranoia





- Suicidal Ideation


Suicidal Ideation: No





- Homicidal Ideation


Homicidal Ideation: No





Goal/Treatment Plan





- Goal/Treatment Plan


Need for Continued Stay: Discharge may exacerbated symptoms, Severe functional 

impairment


Progress Toward Problem(s) and Goals/Treatment Plan: 





Start:


Haldol 5mg PO BID


Cogentin 1mg PO BID


atarax 50 mg. PRN Q6


Mertazipine 30 mg PO HS JESSI


Trazodone 100 mg PO HS JESSI


Attend groups and activities


Individual therapy


Psychoeducation and support


Encourage compliance with meds and after care


Refer to outpatient program 


Teach healthy lifestyle methods, i.e. diet, exercise, meditation


Smoking cessation











- Smoking Cessation


Smoking Cessation Initiated: No

## 2017-11-08 NOTE — PCM.PYCHPN
Psychiatric Progress Note





- Psychiatric Progress Note


Patient seen today, length of contact: 16 min


Patient Chief Complaint: 





"I slept okay last night"





Problems Identified/Issues Discussed: 





The pt is seen, chart reviewed, case discussed with staff.





Patient remained isolated, confined and withdrawn. She remained disorganized 

and internally preoccupied and still reports of hearing voices. Patient states 

the voices are male and female and are just "saying stuff" but would not 

elaborate on what they are saying. She still appears paranoid and delusional. 

She reports depressed mood and feelings of hopelessness and helplessness. She 

denies any suicidal ideation or homicidal ideation. The pt is compliant with 

medications and reports no side-effects.





Symptoms are improving but needs more time to stabilize.





Supportive therapy and psychoeducation were given.





Medication Change: Yes (start Zoloft)


Medical Record Reviewed: Yes





Mental Status Examination





- Cognitive Function


Orientation: Person, Place


Memory: Intact


Attention: WNL


Concentration: Poor


Association: Loose


Fund of Knowledge: WNL





- Mood


Mood: Depressed, Anxious





- Affect


Affect: Constricted, Depressed





- Speech


Speech: Appropriate





- Formal Thought Process


Formal Thought Process: Hallucinations, Delusions, Paranoia





- Suicidal Ideation


Suicidal Ideation: No





- Homicidal Ideation


Homicidal Ideation: No





Goal/Treatment Plan





- Goal/Treatment Plan


Need for Continued Stay: Severe depression anxiety, Discharge may exacerbated 

symptoms, Severe functional impairment


Progress Toward Problem(s) and Goals/Treatment Plan: 





Continue:


Haldol 5mg PO BID


Cogentin 1mg PO BID


Atarax 50 mg. PRN Q6


Start Zoloft 50 mg PO Daily


Mertazipine 30 mg PO HS JESSI


Trazodone 100 mg PO HS JESSI





Continue medications


Support and psychoeducation daily


Attend groups and activities daily


After care planning by BRENDAN D/C next week





- Smoking Cessation


Smoking Cessation Initiated: No

## 2017-11-09 NOTE — PCM.PYCHPN
Psychiatric Progress Note





- Psychiatric Progress Note


Patient seen today, length of contact: 16 min


Patient Chief Complaint: 





"I am doing okay"





Problems Identified/Issues Discussed: 





The pt is seen, chart reviewed, case discussed with staff.





Patient remained isolated, confined and withdrawn, remaining in her own room 

most of the day. She remained disorganized and internally preoccupied and still 

reports of hearing voices. She still appears paranoid and delusional. She 

reports depressed mood and feelings of hopelessness and helplessness. She 

denies any suicidal ideation or homicidal ideation. The pt is compliant with 

medications and reports no side-effects.





Symptoms are improving but needs more time to stabilize.





Supportive therapy and psychoeducation were given.





Medication Change: Yes (increase haldol)


Medical Record Reviewed: Yes





Mental Status Examination





- Cognitive Function


Orientation: Person, Place, Situation


Memory: Intact


Attention: WNL


Concentration: Poor


Association: Loose


Fund of Knowledge: WNL





- Mood


Mood: Depressed, Anxious





- Affect


Affect: Constricted, Depressed





- Speech


Speech: Appropriate





- Formal Thought Process


Formal Thought Process: Hallucinations, Delusions, Paranoia





- Suicidal Ideation


Suicidal Ideation: No





- Homicidal Ideation


Homicidal Ideation: No





Goal/Treatment Plan





- Goal/Treatment Plan


Need for Continued Stay: Severe depression anxiety, Discharge may exacerbated 

symptoms, Severe functional impairment


Progress Toward Problem(s) and Goals/Treatment Plan: 





Continue:


Haldol 10 mg PO BID


Cogentin 1mg PO BID


Atarax 50 mg. PRN Q6


Start Zoloft 50 mg PO Daily


Mertazipine 30 mg PO HS JESSI


Trazodone 100 mg PO HS JESSI





Continue medications


Support and psychoeducation daily


Attend groups and activities daily


After care planning by BRENDAN D/C next week

## 2017-11-10 NOTE — PCM.PYCHPN
Psychiatric Progress Note





- Psychiatric Progress Note


Patient seen today, length of contact: 16 min


Patient Chief Complaint: 





"I feel good"





Problems Identified/Issues Discussed: 





The pt is seen, chart reviewed, case discussed with staff.





Patient remained isolated, confined and withdrawn, remaining in her own room 

most of the day. She still appears paranoid and delusional. She denies any 

suicidal ideation or homicidal ideation. Patient slept through the night. 





The pt is compliant with medications and reports no side-effects.


After care discussed, support and psychoeducation given.


Symptoms are improving but needs more time to stabilize.


Supportive therapy and psychoeducation were given.





Medication Change: Yes (increase haldol)


Medical Record Reviewed: Yes





Mental Status Examination





- Cognitive Function


Orientation: Person, Place, Situation


Memory: Intact


Attention: WNL


Concentration: Poor


Association: Loose


Fund of Knowledge: WNL





- Mood


Mood: Depressed, Anxious





- Affect


Affect: Constricted, Depressed





- Speech


Speech: Appropriate





- Formal Thought Process


Formal Thought Process: Hallucinations, Delusions, Paranoia





- Suicidal Ideation


Suicidal Ideation: No





- Homicidal Ideation


Homicidal Ideation: No





Goal/Treatment Plan





- Goal/Treatment Plan


Need for Continued Stay: Severe depression anxiety, Discharge may exacerbated 

symptoms, Severe functional impairment


Progress Toward Problem(s) and Goals/Treatment Plan: 





Continue:


Haldol 10 mg PO BID


Cogentin 1mg PO BID


Atarax 50 mg. PRN Q6


Start Zoloft 50 mg PO Daily


Mertazipine 30 mg PO HS JESSI


Trazodone 100 mg PO HS JESSI





Continue medications


Support and psychoeducation daily


Attend groups and activities daily


After care planning by BRENDAN D/DERRELL Monday

## 2017-11-11 NOTE — PCM.PYCHPN
Psychiatric Progress Note





- Psychiatric Progress Note


Patient seen today, length of contact: 16 min


Patient Chief Complaint: 





"I feel good"





Problems Identified/Issues Discussed: 





The pt is seen, chart reviewed, case discussed with staff.





Patient remained isolated, confined and withdrawn, remaining in her own room 

most of the day. She still appears paranoid and delusional. She denies any 

suicidal ideation or homicidal ideation. Patient slept through the night. 





The pt is compliant with medications and reports no side-effects.


After care discussed, support and psychoeducation given.


Symptoms are improving but needs more time to stabilize.


Supportive therapy and psychoeducation were given.





Medication Change: Yes (increase zoloft)


Medical Record Reviewed: Yes





Mental Status Examination





- Cognitive Function


Orientation: Person, Place, Situation


Memory: Intact


Attention: WNL


Concentration: Poor


Association: Loose


Fund of Knowledge: WNL





- Mood


Mood: Depressed, Anxious





- Affect


Affect: Constricted, Depressed





- Speech


Speech: Appropriate





- Formal Thought Process


Formal Thought Process: Hallucinations, Delusions, Paranoia





- Suicidal Ideation


Suicidal Ideation: No





- Homicidal Ideation


Homicidal Ideation: No





Goal/Treatment Plan





- Goal/Treatment Plan


Need for Continued Stay: Severe depression anxiety, Discharge may exacerbated 

symptoms, Severe functional impairment


Progress Toward Problem(s) and Goals/Treatment Plan: 





Continue:


Haldol 10 mg PO BID


Cogentin 1mg PO BID


Atarax 50 mg. PRN Q6


Start Zoloft 50 mg PO Daily


Mertazipine 30 mg PO HS JESSI


Trazodone 100 mg PO HS JESSI





Continue medications


Support and psychoeducation daily


Attend groups and activities daily


After care planning by BRENDAN D/DERRELL Monday

## 2017-11-12 VITALS — OXYGEN SATURATION: 97 %

## 2017-11-13 NOTE — PCM.PYCHPN
Psychiatric Progress Note





- Psychiatric Progress Note


Patient seen today, length of contact: 16 min


Patient Chief Complaint: 





"I feel fine"








Problems Identified/Issues Discussed: 





The pt is seen, chart reviewed, case discussed with staff.





Patient appears less paranoid and less delusional. However, she remained 

isolated, confined and withdrawn, remaining in her own room most of the day. 

She  denies any suicidal ideation or homicidal ideation. She reports some 

improvement in the AH. Patient slept through the night. Has no complaints or 

questions. 





The pt is compliant with medications and reports no side-effects.


After care discussed, support and psychoeducation given.


Symptoms are improving but needs more time to stabilize.


Supportive therapy and psychoeducation were given.





Medication Change: Yes (increase zoloft)


Medical Record Reviewed: Yes





Mental Status Examination





- Cognitive Function


Orientation: Person, Place, Situation, Time


Memory: Intact


Attention: WNL


Concentration: Poor


Association: Loose


Fund of Knowledge: WNL





- Mood


Mood: Depressed, Anxious





- Affect


Affect: Constricted, Depressed





- Speech


Speech: Appropriate





- Formal Thought Process


Formal Thought Process: Delusions, Paranoia





- Suicidal Ideation


Suicidal Ideation: No





- Homicidal Ideation


Homicidal Ideation: No





Goal/Treatment Plan





- Goal/Treatment Plan


Need for Continued Stay: Severe depression anxiety, Discharge may exacerbated 

symptoms, Severe functional impairment


Progress Toward Problem(s) and Goals/Treatment Plan: 





Continue:


Haldol 10 mg PO BID


Cogentin 1mg PO BID


Atarax 50 mg. PRN Q6


Start Zoloft 100 mg PO Daily


Mertazipine 30 mg PO HS JESSI


Trazodone 100 mg PO HS JESSI





Continue medications


Support and psychoeducation daily


Attend groups and activities daily


After care planning by BRENDAN not at [facility]-they denied her


D/C likely tomorrow

## 2017-11-13 NOTE — PCM.BM
<VaishaliRonel - Last Filed: 11/13/17 10:22>





Treatment Plan Problems





- Problems identified on initial assessmt


  ** Depression


Date Initiated: 11/06/17


Time Initiated: 00:05


Assessment reference: NA


Status: Active


Priority: 1





  ** Suicidal Ideation


Date Initiated: 11/06/17 (with plan: OD on alcohol)


Time Initiated: 00:05


Assessment reference: NA


Status: Active


Priority: 2





Treatment assets and liabiliti


Patient Assests: cooperative, self-reliant, ADL independent, physically healthy

, good support system, negotiates basic needs


Patient Liabilities: financial problems, relationship conflicts, substance abuse

, other (Cognitive function; impaired)





- Milieu Protocol


Maintain good personal hygiene: daily Encourage regular showers, daily Remind 

patient to perform daily oral care


Maintain personal safety: daily Educate patient to report safety concerns to 

staff (prn), other Monitor environment for contraband/sharps (q15min)


Medication safety: Monitor for expected outcome, potential side effects: every 

shift, Assess barriers to learning: every shift, Assess readiness for 

medication education: every shift


Milieu Narrative: 





Continue:


Haldol 10 mg PO BID


Cogentin 1mg PO BID


Atarax 50 mg. PRN Q6


Start Zoloft 50 mg PO Daily


Mertazipine 30 mg PO HS JESSI


Trazodone 100 mg PO HS JESSI





Continue medications


Support and psychoeducation daily


Attend groups and activities daily


After care planning by BRENDAN HAYES/DERRELL Monday





Family Contact


Family involvement: Famliy/SO not involved





- Goals for Treatment


Patient goals for treatment: "I don't know."





Discharge/Continuing Care





- Education Needs


Education Needs: Patient Medication, Patient Coping Skills, Patient Placement 

options, Patient Community resources





- Discharge


Discharge Criteria: Tolerates medication w/o severe side effects, No longer 

exhibiting s/s of withdrawal, Reduction of target symptoms


Discharge to:: Shelter





- Treatment Team Participation


Patient/Family/SO Statement: 





Continue:


Haldol 10 mg PO BID


Cogentin 1mg PO BID


Atarax 50 mg. PRN Q6


Start Zoloft 50 mg PO Daily


Mertazipine 30 mg PO HS JESSI


Trazodone 100 mg PO HS JESSI





Continue medications


Support and psychoeducation daily


Attend groups and activities daily


After care planning by BRENDAN GARCIA Monday


Discussed with Family/SO: No


Was Patient/Family/SO present at Treatment Team Meeting: Yes





Treatment Plan Review





- Problem


  ** Depression


Time Initiated: 00:05





  ** Suicidal Ideation


Time Initiated: 00:05





- Discharge / Continuing Care


Discharge to:: Shelter


Behavioral Health Services: Partial hospital


Health Needs: Medications/Rx, Alcohol/Drug treatment





<Franchesca Ambrocio - Last Filed: 11/13/17 10:56>





- Diagnosis


(1) Schizophrenia


Status: Acute   


Interventions: 





11/13/17 10:56


* Assess/adjust medications daily and /or as needed


* See patient on an individual basis 7x/week to assess status of hallucinations


* Discuss risks, benefits, side effects and alternatives of medications


* 








(2) Alcohol use disorder, severe, dependence


Status: Acute   


Interventions: 





* Assess 7x/week regarding severity of withdrawal


* Educate regarding risks, benefits, side effects and alternatives of 

medications


* Use Motivational Interviewing for abstinence


* Use CBT for relapse prevention


* Medication management for withdrawal symptoms


* Encourage medication assisted treatment


* 








<Jane Polk - Last Filed: 11/13/17 13:34>





Treatment Plan Review





- Problem


  ** Depression


Date Initiated: 11/13/17


Time Initiated: 13:33


Progress toward outcomes: improved





  ** Suicidal Ideation


Date Initiated: 11/13/17


Time Initiated: 13:34


Progress toward outcomes: resolved

## 2017-11-14 NOTE — PCM.PYCHPN
Psychiatric Progress Note





- Psychiatric Progress Note


Patient seen today, length of contact: 15 min


Patient Chief Complaint: 





"People keep messing with me"








Problems Identified/Issues Discussed: 





The pt is seen, chart reviewed, case discussed with staff.


The pt is compliant with medications and reports no side-effects.


The patient reports return of command type auditory hallucinations telling her 

to hurt herself.


She denies SI or visual hallucinations


After care discussed, support and psychoeducation given.





Medication Change: Yes (haldol decanoate)


Medical Record Reviewed: Yes





Mental Status Examination





- Cognitive Function


Orientation: Person, Place, Situation, Time


Memory: Intact


Attention: WNL


Concentration: Poor


Association: Loose


Fund of Knowledge: WNL





- Mood


Mood: Depressed, Anxious





- Affect


Affect: Constricted, Depressed





- Speech


Speech: Appropriate





- Formal Thought Process


Formal Thought Process: Delusions, Paranoia





- Suicidal Ideation


Suicidal Ideation: No





- Homicidal Ideation


Homicidal Ideation: No





Goal/Treatment Plan





- Goal/Treatment Plan


Need for Continued Stay: Severe depression anxiety, Discharge may exacerbated 

symptoms, Severe functional impairment


Progress Toward Problem(s) and Goals/Treatment Plan: 





Continue:


Haldol decanoate 100 mg I/M stat


Haldol 10 mg bid


Cogentin 1mg PO BID


Atarax 50 mg PRN Q6


Start Zoloft 200 mg PO Daily


Mertazipine 30 mg PO HS JESSI


Trazodone 100 mg PO HS JESSI





Continue medications


Support and psychoeducation daily


Attend groups and activities daily


After care planning by SW not at [facility]-they denied her, will send home to family


D/C likely tomorrow

## 2017-11-15 NOTE — PCM.PYCHPN
Psychiatric Progress Note





- Psychiatric Progress Note


Patient seen today, length of contact: 15 min


Patient Chief Complaint: 





"People keep messing with me"








Problems Identified/Issues Discussed: 





The pt is seen, chart reviewed, case discussed with staff.


The pt is compliant with medications and reports no side-effects.


The patient reports return of command type auditory hallucinations telling her 

to hurt herself.


She denies SI or visual hallucinations


After care discussed, support and psychoeducation given.





Medication Change: Yes (haldol decanoate)


Medical Record Reviewed: Yes





Mental Status Examination





- Cognitive Function


Orientation: Person, Place, Situation, Time


Memory: Intact


Attention: WNL


Concentration: Poor


Association: Loose


Fund of Knowledge: WNL





- Mood


Mood: Depressed, Anxious





- Affect


Affect: Constricted, Depressed





- Speech


Speech: Appropriate





- Formal Thought Process


Formal Thought Process: Delusions, Paranoia





- Suicidal Ideation


Suicidal Ideation: No





- Homicidal Ideation


Homicidal Ideation: No





Goal/Treatment Plan





- Goal/Treatment Plan


Need for Continued Stay: Severe depression anxiety, Discharge may exacerbated 

symptoms, Severe functional impairment


Progress Toward Problem(s) and Goals/Treatment Plan: 





Continue:


Haldol decanoate 100 mg I/M stat


Haldol 10 mg bid


Cogentin 1mg PO BID


Atarax 50 mg PRN Q6


Start Zoloft 200 mg PO Daily


Mertazipine 30 mg PO HS JESSI


Trazodone 100 mg PO HS JESSI





Continue medications


Support and psychoeducation daily


Attend groups and activities daily


After care planning by SW not at [facility]-they denied her, will send home to family


D/C likely tomorrow

## 2017-11-16 VITALS
DIASTOLIC BLOOD PRESSURE: 83 MMHG | TEMPERATURE: 98.5 F | RESPIRATION RATE: 16 BRPM | SYSTOLIC BLOOD PRESSURE: 120 MMHG | HEART RATE: 73 BPM

## 2017-11-16 NOTE — PCM.PYCHDC
Mental Status Examination





- Mental Status Examination


Orientation: Person, Place, Situation, Time


Memory: Intact


Mood: Neutral


Affect: Constricted


Speech: Soft


Attention: WNL


Concentration: WNL


Association: WNL


Fund of Knowledge: WNL


Formal Thought Process: No Impairment


Description of patient's judgement and insight: 





good, fair


Psychotic Thoughts and Behaviors: 





denies any AVH


Suicidal Ideation: No


Current Homicidal Ideation?: No





Discharge Summary





- Discharge Note


Reason for Hospitalization: 








Patient is a 32 years old, single, unemployed, -American female, with 

history of schizoaffective disorder depressive type, follow-up at [facility] was admitted due to worsening symptoms of depression and 

suicidal ideations without plan. 





She currently presents with suicidal ideations and auditory hallucinations that 

tell her to harm and kill herself.


 


Patient was last seen on 9/28/2017. During that admission patient reported 

suicidal ideation and depression with no plan. Later, Patient was reported to 

have changed her story and report plan to overdose using heroin. Patent's BAL 

was 31. Patient denied auditory/ visual hallucinations. Patient has history of 

alcohol use, heroin use and marijuana use. It was recommended that patient 

attend [facility] outpatient, however, Patient does not participate in the program. 

Patient has history of being prescirbed haldol.





After latest discharge patient stated she began drinking again. She initially 

reports that she drinks 4 shots a day but later said she drinks 2 pints a day. 

Denied using any other drugs including cocaine, cannabis or heroin. 





Patient was born in [place], has ninth grade of education. Not working. 

Never . Has one, one month for child. Patient's sister has custody of 

patient's son. Lives alone. Height is 5 feet 5 inches and weight is 180 pounds.





Writer contacted Patient's grandmother who was very annoyed by phone call. 

Patient's grandmother stated "she's playing you all. Juneaq is drinking. She 

left her mother's where she had a place to stay. She won't do the right thing 

and right now there is no hope for her. She's using the system because she does'

t want to do the right thing." Patient stated she was kicked out of her mother'

s house by her mother because her mother was fighting with her boyfriend. 





Patient's grandmother refused to provide contact information for additional 

information from Patient's mother or sister, Tavia. Patient's grandmother noted 

that Patient knows the contact number and crisis worker should deal directly 

with [facility].





Patient is a poor historian as noted by disorganized thoughts.





Consultations:: List each consultation separately and include:  1. Reason for 

request.  2. Findings.  3. Follow-up


Summary of Hospital Course include:: 1. Description of specific treatment plan 

utilized for patients during their course of treatmen.  2. Summarize the time-

course for resolution of acute symptoms and/or regressed behaviors.  3. 

Describe issues identified and worked on during hospitalization.  4. Describe 

medication utilized.  5. Describe medical problems identified and treated.  6. 

Reassessment of suicide risk


Summary of Hospital Course: 





During the course of her stay, patient (pt) started progressively improving and 

she no longer remained irritable, depressed, paranoid and suicidal. Her mood 

and paranoia were improved and she started attending groups and meetings and 

started socializing. Patient denied any feelings of hopelessness, helplessness, 

and worthlessness, denied any problem with the sleep or appetite, denied 

suicidal ideation or homicidal ideation. Pt denied any auditory or visual 

hallucinations.  


Some changes were made in her current medications and patient was discharged on 

following medications. She tolerated these medications very well and denied any 

side effects. She was discharged with a plan to f/u with [facility].








- Diagnosis


(1) Schizophrenia


Status: Acute   





(2) Alcohol use disorder, severe, dependence


Status: Acute   





- Final Diagnosis (DSM 5)


Condition upon Discharge: STABLE


DSM 5: 





Schizoeffective disorder depressive type





Disposition: HOME/ ROUTINE


Follow-up Treatment Plan: 





Education:





Pt was educated and counseled about the risks and benefits of taking and not 

taking medications.  





Pt was educated and counseled about the risks of drinking and abusing drugs.   





Pt was educated and counseled to go to the ER or call 911 if pt develop 

suicidal ideation or homicidal ideation, worsening of symptoms or severe side 

effects of the meds.


Prescriptions/Medication Reconciliation: 


Benztropine [Cogentin] 1 mg PO BID #60 tab


Haloperidol [Haldol] 10 mg PO BID #60 tab


Mirtazapine [Remeron] 30 mg PO HS #30 tab


Sertraline [Zoloft] 100 mg PO DAILY #60 tab


traZODone [Desyrel] 100 mg PO HS PRN #30 tab


 PRN Reason: Insomnia





- Smoking Cessation


Smoking Cessation Medication prescribed: No





- Antipsychotic Medications


Pt discharged on 2 or more routine antipsychotic medications: No

## 2018-01-13 ENCOUNTER — HOSPITAL ENCOUNTER (EMERGENCY)
Dept: HOSPITAL 31 - C.ER | Age: 33
Discharge: HOME | End: 2018-01-13
Payer: MEDICAID

## 2018-01-13 VITALS — OXYGEN SATURATION: 98 %

## 2018-01-13 VITALS — BODY MASS INDEX: 33.2 KG/M2

## 2018-01-13 VITALS — HEART RATE: 68 BPM | SYSTOLIC BLOOD PRESSURE: 123 MMHG | DIASTOLIC BLOOD PRESSURE: 61 MMHG | RESPIRATION RATE: 16 BRPM

## 2018-01-13 VITALS — TEMPERATURE: 98.9 F

## 2018-01-13 DIAGNOSIS — R45.851: ICD-10-CM

## 2018-01-13 DIAGNOSIS — F32.9: Primary | ICD-10-CM

## 2018-01-13 LAB
ALBUMIN SERPL-MCNC: 3.6 G/DL (ref 3.5–5)
ALBUMIN/GLOB SERPL: 1.2 {RATIO} (ref 1–2.1)
ALT SERPL-CCNC: 13 U/L (ref 9–52)
AST SERPL-CCNC: 16 U/L (ref 14–36)
BASOPHILS # BLD AUTO: 0.1 K/UL (ref 0–0.2)
BASOPHILS NFR BLD: 0.9 % (ref 0–2)
BILIRUB UR-MCNC: NEGATIVE MG/DL
BUN SERPL-MCNC: 3 MG/DL (ref 7–17)
CALCIUM SERPL-MCNC: 8.2 MG/DL (ref 8.6–10.4)
EOSINOPHIL # BLD AUTO: 0.1 K/UL (ref 0–0.7)
EOSINOPHIL NFR BLD: 0.9 % (ref 0–4)
ERYTHROCYTE [DISTWIDTH] IN BLOOD BY AUTOMATED COUNT: 22.4 % (ref 11.5–14.5)
GFR NON-AFRICAN AMERICAN: > 60
GLUCOSE UR STRIP-MCNC: NORMAL MG/DL
HCG,QUALITATIVE URINE: NEGATIVE
HGB BLD-MCNC: 10.5 G/DL (ref 11–16)
LEUKOCYTE ESTERASE UR-ACNC: (no result) LEU/UL
LYMPHOCYTES # BLD AUTO: 2.9 K/UL (ref 1–4.3)
LYMPHOCYTES NFR BLD AUTO: 32.4 % (ref 20–40)
MCH RBC QN AUTO: 24.1 PG (ref 27–31)
MCHC RBC AUTO-ENTMCNC: 32.8 G/DL (ref 33–37)
MCV RBC AUTO: 73.5 FL (ref 81–99)
MONOCYTES # BLD: 0.4 K/UL (ref 0–0.8)
MONOCYTES NFR BLD: 5 % (ref 0–10)
NEUTROPHILS # BLD: 5.4 K/UL (ref 1.8–7)
NEUTROPHILS NFR BLD AUTO: 60.8 % (ref 50–75)
NRBC BLD AUTO-RTO: 0.1 % (ref 0–2)
PH UR STRIP: 6 [PH] (ref 5–8)
PLATELET # BLD: 372 K/UL (ref 130–400)
PMV BLD AUTO: 7.5 FL (ref 7.2–11.7)
PROT UR STRIP-MCNC: NEGATIVE MG/DL
RBC # BLD AUTO: 4.37 MIL/UL (ref 3.8–5.2)
RBC # UR STRIP: NEGATIVE /UL
SP GR UR STRIP: 1.01 (ref 1–1.03)
SQUAMOUS EPITHIAL: 13 /HPF (ref 0–5)
URINE NITRATE: NEGATIVE
UROBILINOGEN UR-MCNC: NORMAL MG/DL (ref 0.2–1)
WBC # BLD AUTO: 8.9 K/UL (ref 4.8–10.8)

## 2018-01-13 NOTE — C.PDOC
Addendum entered and electronically signed by Alivia Welsh PA-C  18 09

:39: 








Addendum


Addendum: 


18 07:10


Call BLANCA Feldman to evaluated patient for psych





18 08:15


Francia states she will call Dr Ambrocio to discuss case. Patient is sober and 

states she is not suicidal. 





18 09:38


Dr Ambrocio calls back and states patient is appropriate for discharge. She is 

well known to psych unit and is no threat or harm to herself. 





Original Note:








History Of Present Illness


32 year old female presents to the ER stating she has suicidal ideation. 

Patient denies any plan but notes she was drinking a lot yesterday and thought 

she would die from it.


Time Seen by Provider: 18 01:52


Chief Complaint (Nursing): Psychiatric Evaluation


History Per: Patient


History/Exam Limitations: no limitations


Onset/Duration Of Symptoms: Days


Current Symptoms Are (Timing): Still Present


Suicide/Self Injury Attempted (Context): None


Associated Symptoms: Suicidal Thoughts.  denies: Suicidal Plan


Involuntary Hold By: None


Recent travel outside of the [place]: No





Past Medical History


Reviewed: Historical Data, Nursing Documentation, Vital Signs


Vital Signs: 


 Last Vital Signs











Temp  97.9 F   18 01:44


 


Pulse  87   18 01:44


 


Resp  16   18 01:44


 


BP  115/79   18 01:44


 


Pulse Ox  99   18 06:01














- Medical History


PMH: Anemia, Anxiety, Asthma, Bipolar Disorder, Depression, HTN (NO MEDS PER PT)

, Schizophrenia


Surgical History: 





- [facility] Procedures








CERVICAL LES DESTRUC NEC (02)


GROUP PSYCHOTHERAPY (17)


INDIVIDUAL PSYCHOTHERAPY, COGNITIVE-BEHAVIORAL (17)


INDIVIDUAL PSYCHOTHERAPY, SUPPORTIVE (17)


INJECT/INFUSE NEC (10/21/14)


MEDICATION MANAGEMENT (10/30/16)


PSYCHIA INTERV/EVAL NEC (14)


VACUUM EXT DEL W EPISIOT (02)








Family History: States: Unknown Family Hx





- Social History


Hx Tobacco Use: Yes


Hx Alcohol Use: Yes


Hx Substance Use: No





- Immunization History


Hx Tetanus Toxoid Vaccination: No


Hx Influenza Vaccination: No


Hx Pneumococcal Vaccination: No





Review Of Systems


Constitutional: Negative for: Fever, Chills


Gastrointestinal: Negative for: Nausea, Vomiting, Diarrhea


Psych: Positive for: Suicidal ideation





Physical Exam





- Physical Exam


Appears: Non-toxic, No Acute Distress


Skin: Normal Color, Warm, Dry


Head: Atraumatic, Normacephalic


Eye(s): bilateral: Normal Inspection


Oral Mucosa: Moist


Chest: Symmetrical, No Tenderness


Cardiovascular: Rhythm Regular


Respiratory: Normal Breath Sounds, No Rales, No Rhonchi, No Wheezing


Gastrointestinal/Abdominal: Soft, No Tenderness


Neurological/Psych: Oriented x3, Normal Speech





ED Course And Treatment





- Laboratory Results


Result Diagrams: 


 18 03:44





 18 03:44


O2 Sat by Pulse Oximetry: 99 (Room air)


Pulse Ox Interpretation: Normal


Progress Note: Blood work and urinalysis ordered. Patient placed on 1 to 1 and 

crisis will evaluate patient. Patient is medically cleared for psych 

evaluation. Crisis worker evaluation pending.





Disposition





- Disposition


Disposition Time: 07:00


Condition: STABLE


Forms:  [employer] (English)





- Clinical Impression


Clinical Impression: 


 Depression, Suicidal ideation








- PA / NP / Resident Statement


MD/DO has reviewed & agrees with the documentation as recorded.





- Scribe Statement


The provider has reviewed the documentation as recorded by the Scribe





Kade Cline





All medical record entries made by the Scribe were at my direction and 

personally dictated by me. I have reviewed the chart and agree that the record 

accurately reflects my personal performance of the history, physical exam, 

medical decision making, and the department course for this patient. I have 

also personally directed, reviewed, and agree with the discharge instructions 

and disposition.





Physician Patient Turnover


Patient Signed Over To: Alivia Welsh


Handoff Comments: pending crisis evaluation and dispo





Decision To Admit





- .


Patient Diagnosis: 


 Depression, Suicidal ideation

## 2018-11-05 ENCOUNTER — HOSPITAL ENCOUNTER (EMERGENCY)
Dept: HOSPITAL 31 - C.ER | Age: 33
Discharge: LEFT BEFORE DISCHARGE | End: 2018-11-05
Payer: MEDICAID

## 2018-11-05 VITALS
TEMPERATURE: 97.8 F | DIASTOLIC BLOOD PRESSURE: 90 MMHG | OXYGEN SATURATION: 100 % | RESPIRATION RATE: 17 BRPM | HEART RATE: 76 BPM | SYSTOLIC BLOOD PRESSURE: 174 MMHG

## 2018-11-05 VITALS — BODY MASS INDEX: 33.2 KG/M2

## 2018-11-05 DIAGNOSIS — R10.9: ICD-10-CM

## 2018-11-05 DIAGNOSIS — Z02.89: Primary | ICD-10-CM

## 2018-11-05 NOTE — C.PDOC
Time Seen by Provider: 18 19:18


Chief Complaint (Nursing): Abdominal Pain





Past Medical History


Vital Signs: 





                                Last Vital Signs











Temp  97.8 F   18 18:46


 


Pulse  76   18 18:46


 


Resp  17   18 18:46


 


BP  174/90 H  18 18:46


 


Pulse Ox  100   18 18:46














- Medical History


PMH: Anemia, Anxiety, Asthma, Bipolar Disorder, Depression, HTN (NO MEDS PER 

PT), Schizophrenia


   Denies: Diabetes, Hepatitis, HIV, Chronic Kidney Disease, Seizures, Sexually 

Transmitted Disease


Surgical History: 





- [facility] Procedures











CERVICAL LES DESTRUC NEC (02)


GROUP PSYCHOTHERAPY (17)


INDIVIDUAL PSYCHOTHERAPY, COGNITIVE-BEHAVIORAL (17)


INDIVIDUAL PSYCHOTHERAPY, SUPPORTIVE (17)


INJECT/INFUSE NEC (10/21/14)


MEDICATION MANAGEMENT (10/30/16)


PSYCHIA INTERV/EVAL NEC (14)


VACUUM EXT DEL W EPISIOT (02)








Family History: States: Unknown Family Hx





- Social History


Hx Tobacco Use: Yes


Hx Alcohol Use: Yes


Hx Substance Use: No





- Immunization History


Hx Tetanus Toxoid Vaccination: No


Hx Influenza Vaccination: No


Hx Pneumococcal Vaccination: No





ED Course And Treatment


O2 Sat by Pulse Oximetry: 100


Pulse Ox Interpretation: Normal





Disposition


Counseled Patient/Family Regarding: Studies Performed, Diagnosis





- Disposition


Disposition Time: 19:18